# Patient Record
Sex: FEMALE | Race: BLACK OR AFRICAN AMERICAN | NOT HISPANIC OR LATINO | URBAN - METROPOLITAN AREA
[De-identification: names, ages, dates, MRNs, and addresses within clinical notes are randomized per-mention and may not be internally consistent; named-entity substitution may affect disease eponyms.]

---

## 2024-04-09 ENCOUNTER — OFFICE VISIT (OUTPATIENT)
Dept: PAIN MEDICINE | Facility: CLINIC | Age: 39
End: 2024-04-09
Payer: COMMERCIAL

## 2024-04-09 VITALS — BODY MASS INDEX: 40.65 KG/M2 | WEIGHT: 259 LBS | HEIGHT: 67 IN

## 2024-04-09 DIAGNOSIS — M25.551 RIGHT HIP PAIN: Primary | ICD-10-CM

## 2024-04-09 DIAGNOSIS — M53.3 SACROILIAC JOINT PAIN: ICD-10-CM

## 2024-04-09 DIAGNOSIS — M54.50 CHRONIC RIGHT-SIDED LOW BACK PAIN WITHOUT SCIATICA: ICD-10-CM

## 2024-04-09 DIAGNOSIS — G89.29 CHRONIC RIGHT-SIDED LOW BACK PAIN WITHOUT SCIATICA: ICD-10-CM

## 2024-04-09 PROCEDURE — 1160F RVW MEDS BY RX/DR IN RCRD: CPT | Mod: CPTII,S$GLB,, | Performed by: PHYSICAL MEDICINE & REHABILITATION

## 2024-04-09 PROCEDURE — 3008F BODY MASS INDEX DOCD: CPT | Mod: CPTII,S$GLB,, | Performed by: PHYSICAL MEDICINE & REHABILITATION

## 2024-04-09 PROCEDURE — 99204 OFFICE O/P NEW MOD 45 MIN: CPT | Mod: S$GLB,,, | Performed by: PHYSICAL MEDICINE & REHABILITATION

## 2024-04-09 PROCEDURE — 1159F MED LIST DOCD IN RCRD: CPT | Mod: CPTII,S$GLB,, | Performed by: PHYSICAL MEDICINE & REHABILITATION

## 2024-04-09 RX ORDER — DICLOFENAC SODIUM 50 MG/1
50 TABLET, DELAYED RELEASE ORAL 3 TIMES DAILY PRN
COMMUNITY
Start: 2024-03-27 | End: 2024-05-24

## 2024-04-09 RX ORDER — TIZANIDINE 2 MG/1
2 TABLET ORAL EVERY 8 HOURS PRN
COMMUNITY
Start: 2024-03-27

## 2024-04-09 RX ORDER — HYDROXYZINE HYDROCHLORIDE 25 MG/1
25 TABLET, FILM COATED ORAL 3 TIMES DAILY PRN
COMMUNITY
Start: 2024-03-27

## 2024-04-09 RX ORDER — GABAPENTIN 300 MG/1
300 CAPSULE ORAL 3 TIMES DAILY
COMMUNITY
Start: 2024-03-27

## 2024-04-09 NOTE — PROGRESS NOTES
[facility]      Referring Provider: Lacie Rose Do  [address] [place] [address]    Chief Complaint:   Chief Complaint   Patient presents with    Low-back Pain     Right side        History of Present Illness: Mae Liu is a 38 y.o. female referred by Dr. Lacie Rose for lower back pain.      Onset: 3/2021 s/p hysterectomy  Location: Right lower back/buttocks  Radiation: right groin and right hamstring/posterior thigh  Timing: constant  Quality: Aching, Throbbing, Tingling, Pins/Needles, and Deep  Exacerbating Factors: exercise, running, bending forward, twisting, turning, general activity, and driving  Alleviating Factors: ice, heat, rest, massage, stretching, general activity  Associated Symptoms: She denies night fever/night sweats, urinary incontinence/change in function, bowel incontinence/change in function, unexplained weight loss, significant motor weakness, and loss of sensations    Functional Limitations: Pain is limiting her ability to drive, chores.    She has tried physical therapy for 8 weeks at [facility]. She is taking gabapentin and flexeril which helps some. Diclofenac helps some. She is also taking tizanidine which helps better than flexeril.     Severity: Currently: 1/10   Typical Range: 1-10/10     Exacerbation: 10/10     P = 3   E = 4  G = 4  Baseline PEG Score = 3.67  Current PEG Score: 3.67    Opioid Risk Score         Value Time User    Opioid Risk Score  1 4/9/2024 10:31 AM Cortes Boyer MA             Previous Interventions:      Previous Therapies:  PT/OT: yes x8 weeks, provided no relief  Relevant Surgery: no   Previous Medications:   - NSAIDS: diclofenac   - Muscle Relaxants: tizanidine. Flexeril.    - TCAs:   - SNRIs:   - Topicals:   - Anticonvulsants: gabapentin   - Opioids:     Current Pain Medications:  Diclofenac 50mg  Gabapentin 300mg  Tizanidine 2mg     Blood Thinners: None    Full Medication List:    Current Outpatient Medications:      "diclofenac (VOLTAREN) 50 MG EC tablet, Take 50 mg by mouth 3 (three) times daily as needed., Disp: , Rfl:     gabapentin (NEURONTIN) 300 MG capsule, Take 300 mg by mouth 3 (three) times daily., Disp: , Rfl:     hydrOXYzine HCL (ATARAX) 25 MG tablet, Take 25 mg by mouth 3 (three) times daily as needed., Disp: , Rfl:     tiZANidine (ZANAFLEX) 2 MG tablet, Take 2 mg by mouth every 8 (eight) hours as needed., Disp: , Rfl:      Review of Systems: See HPI    Allergies:  Patient has no known allergies.     Medical History:   has no past medical history on file.    Surgical History:   has a past surgical history that includes Hysterectomy and Tubal ligation.    Family History:  family history is not on file.    Social History:   reports that she has never smoked. She has never used smokeless tobacco. She reports that she does not currently use alcohol. She reports that she does not use drugs.    Physical Exam:  Ht 5' 7" (1.702 m)   Wt 117.5 kg (259 lb)   BMI 40.57 kg/m²   GEN: No acute distress. Calm, comfortable  HENT: Normocephalic, atraumatic, moist mucous membranes  EYE: Anicteric sclera, non-injected.   CV: Non-diaphoretic. Regular Rate. Radial Pulses 2+.  RESP: Breathing comfortably. Chest expansion symmetric.  EXT: No clubbing, cyanosis.   SKIN: Warm, & dry to palpation. No visible rashes or lesions of exposed skin.   PSYCH: Pleasant mood and appropriate affect. Recent and remote memory intact.   GAIT: Independent, normal ambulation  Lumbar Spine Exam:       Inspection: No erythema, bruising.       Palpation: (+) TTP of lumbar paraspinals and SIJ on right (more tender over right SIJ)       ROM: Slightly Limited in flexion, extension, lateral bending.       (+) Facet loading on right      (-) Straight Leg Raise bilaterally      (+) HOANG bilaterally with pain on right      (+) Gaenslan's Test on right      (+) Thigh thrust/Posterior Pelvic Pain Provocation Test on right  Hip Exam:      Inspection: No gross deformity " "or apparent leg length discrepancy      Palpation: (+) TTP to right hip flexor/groin, otherwise No TTP to bilateral greater trochanteric bursas.       ROM:  No limitation or pain in internal rotation, external rotation b/l   Pain w/ resisted hip flexion on right  Neurologic Exam:     Alert. Speech is fluent and appropriate.     Strength:  5/5 throughout bilateral lower extremities     Sensation:  Grossly intact to light touch in bilateral lower extremities     Reflexes: 2+ in b/l patella, achilles     Tone: No abnormality appreciated in bilateral lower extremities     No Clonus           Imaging:  - MRI Lumbar 1/16/24:       Labs:  BMP  No results found for: "NA", "K", "CL", "CO2", "BUN", "CREATININE", "CALCIUM", "ANIONGAP", "EGFRNORACEVR"  No results found for: "ALT", "AST", "GGT", "ALKPHOS", "BILITOT"  No results found for: "PLT"    Assessment:  Mae Liu is a 38 y.o. female with the following diagnoses based on history, exam, and imaging:    Problem List Items Addressed This Visit    None  Visit Diagnoses       Right hip pain    -  Primary    Relevant Orders    X-Ray Hips Bilateral 2 View Incl AP Pelvis (Completed)    Ambulatory referral/consult to Physical/Occupational Therapy    Chronic right-sided low back pain without sciatica        Relevant Orders    X-Ray Lumbar Spine AP And Lateral (Completed)    Ambulatory referral/consult to Physical/Occupational Therapy    Sacroiliac joint pain        Relevant Orders    Ambulatory referral/consult to Physical/Occupational Therapy            This is a pleasant 38 y.o. lady presenting with:     - Chronic right low back pain: Pain is multifactorial, but suspect primarily due to SIJ dysfunction. There is some edema in the right L4-5 facet joint which may also be contributing and possible synovial cyst extending posteriorly from right L4-5 facet joint.   - Right hip pain: X-rays normal. Suspect hip flexor strain.   - Comorbidities: BMI > 40.     Treatment Plan:   - " PT/OT/HEP: Refer to PT to work on SIJ and hip flexor. Discussed benefits of exercise for pain.   - Procedures: Plan for right SIJ CSI if no relief with conservative measures.   - Medications: No changes recommended at this time.   - Consider adding duloxetine if patient does not improve with above  - Imaging: Reviewed. X-ray lumbar spine and hips.   - Labs: Reviewed.  Medications are appropriately dosed for current hepatorenal function.    Follow Up: RTC in 4-6 weeks or sooner PRN    Brenda Morris MD  Interventional Pain Medicine

## 2024-04-10 DIAGNOSIS — M54.50 LOW BACK PAIN, UNSPECIFIED BACK PAIN LATERALITY, UNSPECIFIED CHRONICITY, UNSPECIFIED WHETHER SCIATICA PRESENT: Primary | ICD-10-CM

## 2024-04-25 ENCOUNTER — TELEPHONE (OUTPATIENT)
Dept: PAIN MEDICINE | Facility: CLINIC | Age: 39
End: 2024-04-25
Payer: COMMERCIAL

## 2024-04-25 NOTE — TELEPHONE ENCOUNTER
As of today, Auth of MRI approved by secondary insurance, but denied by primary- [facility].     called [facility] to request notes of these findings to be able to submit to pt's insurance to appeal. Had to leave [facility] for a return call.

## 2024-04-25 NOTE — TELEPHONE ENCOUNTER
----- Message from Cortes Boyer MA sent at 4/22/2024  4:52 PM CDT -----    ----- Message -----  From: Brenda Morris MD  Sent: 4/18/2024   2:57 PM CDT  To: Alexandra Gutierrez Staff    Please call patient and let her know that I discussed with her PT, Norman, and I have ordered an MRI with arthrogram of the right hip to better evaluate for possible labral pathology.     Please fax order.

## 2024-04-25 NOTE — TELEPHONE ENCOUNTER
Spoke with pt and informed of Dr Morris's new orders. Pt verbalized understanding and will await a call from scheduling once MRI is approved.

## 2024-05-02 ENCOUNTER — TELEPHONE (OUTPATIENT)
Dept: PAIN MEDICINE | Facility: CLINIC | Age: 39
End: 2024-05-02
Payer: COMMERCIAL

## 2024-05-02 NOTE — TELEPHONE ENCOUNTER
Peer to peer completed for MRI Arthrogram of right hip. Imaging approved. Authorization [id number].

## 2024-05-02 NOTE — TELEPHONE ENCOUNTER
----- Message from Beatriz Beltran sent at 5/1/2024  8:34 AM CDT -----  Patient is requesting a call back at [phone]

## 2024-05-02 NOTE — TELEPHONE ENCOUNTER
I spoke with Mrs Liu to let her know we are scheduling a peer to peer and submitting physical therapy notes to her insurance provider to get approval for MRI.

## 2024-05-15 ENCOUNTER — TELEPHONE (OUTPATIENT)
Dept: PAIN MEDICINE | Facility: CLINIC | Age: 39
End: 2024-05-15
Payer: COMMERCIAL

## 2024-05-15 NOTE — TELEPHONE ENCOUNTER
----- Message from Jahaira Tam sent at 5/15/2024  3:55 PM CDT -----  Contact: PT  Patient Call Back    Patient Name: Mae Liu   Nature of Call: Pt is calling to request for a request to be sent to Medicaid to be approved for a extension of an MRI order. The new scheduling date is 6/5  Call Back Number:[phone]    Additional Information: n/a

## 2024-05-24 ENCOUNTER — OFFICE VISIT (OUTPATIENT)
Dept: PAIN MEDICINE | Facility: CLINIC | Age: 39
End: 2024-05-24
Payer: COMMERCIAL

## 2024-05-24 VITALS
OXYGEN SATURATION: 98 % | HEART RATE: 76 BPM | SYSTOLIC BLOOD PRESSURE: 126 MMHG | BODY MASS INDEX: 40.65 KG/M2 | HEIGHT: 67 IN | WEIGHT: 259 LBS | DIASTOLIC BLOOD PRESSURE: 80 MMHG

## 2024-05-24 DIAGNOSIS — M25.551 RIGHT HIP PAIN: ICD-10-CM

## 2024-05-24 DIAGNOSIS — M54.50 CHRONIC BILATERAL LOW BACK PAIN WITHOUT SCIATICA: ICD-10-CM

## 2024-05-24 DIAGNOSIS — M53.3 SACROILIAC JOINT DYSFUNCTION: Primary | ICD-10-CM

## 2024-05-24 DIAGNOSIS — G89.29 CHRONIC BILATERAL LOW BACK PAIN WITHOUT SCIATICA: ICD-10-CM

## 2024-05-24 DIAGNOSIS — M53.3 SACROILIAC JOINT PAIN: ICD-10-CM

## 2024-05-24 DIAGNOSIS — M46.1 BILATERAL SACROILIITIS: Primary | ICD-10-CM

## 2024-05-24 PROCEDURE — 3008F BODY MASS INDEX DOCD: CPT | Mod: CPTII,S$GLB,, | Performed by: PHYSICIAN ASSISTANT

## 2024-05-24 PROCEDURE — 1159F MED LIST DOCD IN RCRD: CPT | Mod: CPTII,S$GLB,, | Performed by: PHYSICIAN ASSISTANT

## 2024-05-24 PROCEDURE — 3079F DIAST BP 80-89 MM HG: CPT | Mod: CPTII,S$GLB,, | Performed by: PHYSICIAN ASSISTANT

## 2024-05-24 PROCEDURE — 99214 OFFICE O/P EST MOD 30 MIN: CPT | Mod: S$GLB,,, | Performed by: PHYSICIAN ASSISTANT

## 2024-05-24 PROCEDURE — 3074F SYST BP LT 130 MM HG: CPT | Mod: CPTII,S$GLB,, | Performed by: PHYSICIAN ASSISTANT

## 2024-05-24 RX ORDER — DICLOFENAC SODIUM 75 MG/1
TABLET, DELAYED RELEASE ORAL
COMMUNITY
Start: 2024-04-16 | End: 2024-06-18

## 2024-05-24 NOTE — PROGRESS NOTES
[facility]    Chief Complaint:   Chief Complaint   Patient presents with    Low-back Pain       History of Present Illness: Mae Liu is a 38 y.o. female referred by Dr. Lacie Rose for lower back pain.      Onset: 3/2021 s/p hysterectomy  Location: Right lower back/buttocks  Radiation: right groin and right hamstring/posterior thigh  Timing: constant  Quality: Aching, Throbbing, Tingling, Pins/Needles, and Deep  Exacerbating Factors: exercise, running, bending forward, twisting, turning, general activity, and driving  Alleviating Factors: ice, heat, rest, massage, stretching, general activity  Associated Symptoms: She denies night fever/night sweats, urinary incontinence/change in function, bowel incontinence/change in function, unexplained weight loss, significant motor weakness, and loss of sensations    Functional Limitations: Pain is limiting her ability to drive, chores.    She has tried physical therapy for 8 weeks at [facility]. She is taking gabapentin and flexeril which helps some. Diclofenac helps some. She is also taking tizanidine which helps better than flexeril.     Severity: Currently: 1/10   Typical Range: 1-10/10     Exacerbation: 10/10     P = 3   E = 4  G = 4  Baseline PEG Score = 3.67      Interval History (05/24/2024):  Mae Liu returns today for follow up.  At the last clinic visit, referred to physical therapy low back pain    Physical therapy is providing 50% relief, she is continuing to go twice a week.     She is scheduled for MRI of right hip early June.     Currently, the low back pain is stable. Pain now localized to bialteral lumbosacral region without radiation.  Denies any changes in bowel or bladder function. Denies any new weakness or new numbness since the most recent visit. Denies any fevers or recent infections/antibiotics. Denies any unexplained weight loss.     Current Pain Scales:  Current: 7/10              Typical Range: 0-10/10   Current PEG Score:  "9    Opioid Risk Score         Value Time User    Opioid Risk Score  1 4/9/2024 10:31 AM Cortes Boyer MA             Previous Interventions:      Previous Therapies:  PT/OT: yes x8 weeks, provided no relief  Relevant Surgery: no   Previous Medications:   - NSAIDS: diclofenac   - Muscle Relaxants: tizanidine. Flexeril.    - TCAs:   - SNRIs:   - Topicals:   - Anticonvulsants: gabapentin   - Opioids:     Current Pain Medications:  Diclofenac 50mg  Gabapentin 300mg  Tizanidine 2mg     Blood Thinners: None    Full Medication List:    Current Outpatient Medications:     diclofenac (VOLTAREN) 75 MG EC tablet, , Disp: , Rfl:     gabapentin (NEURONTIN) 300 MG capsule, Take 300 mg by mouth 3 (three) times daily., Disp: , Rfl:     hydrOXYzine HCL (ATARAX) 25 MG tablet, Take 25 mg by mouth 3 (three) times daily as needed., Disp: , Rfl:     tiZANidine (ZANAFLEX) 2 MG tablet, Take 2 mg by mouth every 8 (eight) hours as needed., Disp: , Rfl:      Review of Systems: See HPI    Allergies:  Patient has no known allergies.     Medical History:   has no past medical history on file.    Surgical History:   has a past surgical history that includes Hysterectomy and Tubal ligation.    Family History:  family history is not on file.    Social History:   reports that she has never smoked. She has never used smokeless tobacco. She reports that she does not currently use alcohol. She reports that she does not use drugs.    Physical Exam:  /80   Pulse 76   Ht 5' 7" (1.702 m)   Wt 117.5 kg (259 lb)   SpO2 98%   BMI 40.57 kg/m²   GEN: No acute distress. Calm, comfortable  HENT: Normocephalic, atraumatic, moist mucous membranes  EYE: Anicteric sclera, non-injected.   CV: Non-diaphoretic. Regular Rate. Radial Pulses 2+.  RESP: Breathing comfortably. Chest expansion symmetric.  EXT: No clubbing, cyanosis.   SKIN: Warm, & dry to palpation. No visible rashes or lesions of exposed skin.   PSYCH: Pleasant mood and appropriate affect. " "Recent and remote memory intact.   GAIT: Independent, normal ambulation  Lumbar Spine Exam:       Inspection: No erythema, bruising.       Palpation: (+) TTP of lumbar paraspinals and SIJ bilaterally (right > left)      ROM: Slightly Limited in flexion, extension, lateral bending.       (+) Facet loading on right      (-) Straight Leg Raise bilaterally      (+) HOANG bilaterally with pain on right      (+) Gaenslan's Test on right      (+) Thigh thrust/Posterior Pelvic Pain Provocation Test on right  Hip Exam:      Inspection: No gross deformity or apparent leg length discrepancy      Palpation: (+) TTP to right hip flexor/groin, otherwise No TTP to bilateral greater trochanteric bursas.       ROM:  No limitation or pain in internal rotation, external rotation b/l   Pain w/ resisted hip flexion on right  Neurologic Exam:     Alert. Speech is fluent and appropriate.     Strength:  5/5 throughout bilateral lower extremities     Sensation:  Grossly intact to light touch in bilateral lower extremities     Reflexes: 2+ in b/l patella, achilles     Tone: No abnormality appreciated in bilateral lower extremities     No Clonus           Imaging:  - MRI Lumbar 1/16/24:       Labs:  BMP  No results found for: "NA", "K", "CL", "CO2", "BUN", "CREATININE", "CALCIUM", "ANIONGAP", "EGFRNORACEVR"  No results found for: "ALT", "AST", "GGT", "ALKPHOS", "BILITOT"  No results found for: "PLT"    Assessment:  Mae Liu is a 38 y.o. female with the following diagnoses based on history, exam, and imaging:    Problem List Items Addressed This Visit    None  Visit Diagnoses       Sacroiliac joint dysfunction    -  Primary    Sacroiliac joint pain        Chronic bilateral low back pain without sciatica        Right hip pain                This is a pleasant 38 y.o. lady presenting with:     - Chronic right low back pain: Pain is multifactorial, but suspect primarily due to SIJ dysfunction. There is some edema in the right L4-5 facet " joint which may also be contributing and possible synovial cyst extending posteriorly from right L4-5 facet joint.   - Right hip pain: X-rays normal. Suspect hip flexor strain.   - Comorbidities: BMI > 40.     Treatment Plan:   - PT/OT/HEP: Continue PT to work on SIJ and hip flexor. Discussed benefits of exercise for pain.   - Procedures: Schedule bilateral SIJ CSI w/ local/MAC per patient request.   - Medications:   - Recommend using Tylenol 1000 mg every 8 hours as needed for pain.  Do not take this with any other medications containing acetaminophen.  Do not exceed 3000 mg of acetaminophen in 24 hours.   - Consider adding duloxetine if patient does not improve with above  - Imaging: Reviewed. MRI right hip pending.   - Labs: None.  -Request Labs from PCP    Follow Up: RTC in 2 weeks after procedure.    I spent greater than 30 minutes in total in todays visit including face-to-face time with the patient, and time reviewing records/imaging/labs, and documenting.     Fátima Shore PA-C   Interventional Pain Medicine

## 2024-06-03 ENCOUNTER — OUTSIDE PLACE OF SERVICE (OUTPATIENT)
Dept: PAIN MEDICINE | Facility: CLINIC | Age: 39
End: 2024-06-03

## 2024-06-03 PROCEDURE — 27096 INJECT SACROILIAC JOINT: CPT | Mod: 50,,, | Performed by: PHYSICAL MEDICINE & REHABILITATION

## 2024-06-05 ENCOUNTER — OUTSIDE PLACE OF SERVICE (OUTPATIENT)
Dept: INTERVENTIONAL RADIOLOGY/VASCULAR | Facility: CLINIC | Age: 39
End: 2024-06-05
Payer: COMMERCIAL

## 2024-06-18 ENCOUNTER — OFFICE VISIT (OUTPATIENT)
Dept: PAIN MEDICINE | Facility: CLINIC | Age: 39
End: 2024-06-18
Payer: COMMERCIAL

## 2024-06-18 VITALS
HEIGHT: 67 IN | SYSTOLIC BLOOD PRESSURE: 133 MMHG | HEART RATE: 71 BPM | BODY MASS INDEX: 40.65 KG/M2 | DIASTOLIC BLOOD PRESSURE: 94 MMHG | RESPIRATION RATE: 16 BRPM | OXYGEN SATURATION: 98 % | WEIGHT: 259 LBS

## 2024-06-18 DIAGNOSIS — M53.3 SACROILIAC JOINT DYSFUNCTION: ICD-10-CM

## 2024-06-18 DIAGNOSIS — S73.191D TEAR OF RIGHT ACETABULAR LABRUM, SUBSEQUENT ENCOUNTER: ICD-10-CM

## 2024-06-18 DIAGNOSIS — M25.551 RIGHT HIP PAIN: Primary | ICD-10-CM

## 2024-06-18 PROCEDURE — 3075F SYST BP GE 130 - 139MM HG: CPT | Mod: CPTII,S$GLB,, | Performed by: PHYSICAL MEDICINE & REHABILITATION

## 2024-06-18 PROCEDURE — 3008F BODY MASS INDEX DOCD: CPT | Mod: CPTII,S$GLB,, | Performed by: PHYSICAL MEDICINE & REHABILITATION

## 2024-06-18 PROCEDURE — 1159F MED LIST DOCD IN RCRD: CPT | Mod: CPTII,S$GLB,, | Performed by: PHYSICAL MEDICINE & REHABILITATION

## 2024-06-18 PROCEDURE — 99214 OFFICE O/P EST MOD 30 MIN: CPT | Mod: S$GLB,,, | Performed by: PHYSICAL MEDICINE & REHABILITATION

## 2024-06-18 PROCEDURE — 3080F DIAST BP >= 90 MM HG: CPT | Mod: CPTII,S$GLB,, | Performed by: PHYSICAL MEDICINE & REHABILITATION

## 2024-06-18 PROCEDURE — 1160F RVW MEDS BY RX/DR IN RCRD: CPT | Mod: CPTII,S$GLB,, | Performed by: PHYSICAL MEDICINE & REHABILITATION

## 2024-06-18 NOTE — PROGRESS NOTES
[facility]    Chief Complaint:   Chief Complaint   Patient presents with    Low-back Pain     C/o low back pain, s/p bilateral SIJ CSI w/ local/MAC, currently doing well w/PT, states it helps about 50%    Hip Pain     C/o Rt hip pain, states PT makes this worse       History of Present Illness: Mae Liu is a 38 y.o. female referred by Dr. Lacie Rose for lower back pain.      Onset: 3/2021 s/p hysterectomy  Location: Right lower back/buttocks  Radiation: right groin and right hamstring/posterior thigh  Timing: constant  Quality: Aching, Throbbing, Tingling, Pins/Needles, and Deep  Exacerbating Factors: exercise, running, bending forward, twisting, turning, general activity, and driving  Alleviating Factors: ice, heat, rest, massage, stretching, general activity  Associated Symptoms: She denies night fever/night sweats, urinary incontinence/change in function, bowel incontinence/change in function, unexplained weight loss, significant motor weakness, and loss of sensations    Functional Limitations: Pain is limiting her ability to drive, chores.    She has tried physical therapy for 8 weeks at [facility]. She is taking gabapentin and flexeril which helps some. Diclofenac helps some. She is also taking tizanidine which helps better than flexeril.     Severity: Currently: 1/10   Typical Range: 1-10/10     Exacerbation: 10/10     P = 3   E = 4  G = 4  Baseline PEG Score = 3.67      Interval History (05/24/2024):  Mae Liu returns today for follow up.  At the last clinic visit, referred to physical therapy low back pain    Physical therapy is providing 50% relief, she is continuing to go twice a week.     She is scheduled for MRI of right hip early June.     Currently, the low back pain is stable. Pain now localized to bialteral lumbosacral region without radiation.  Denies any changes in bowel or bladder function. Denies any new weakness or new numbness since the most recent visit. Denies any  fevers or recent infections/antibiotics. Denies any unexplained weight loss.     Current Pain Scales:  Current: 7/10              Typical Range: 0-10/10     Interval History (06/18/2024):  Mae Liu returns today for follow up.  At the last clinic visit, Schedule bilateral SIJ CSI w/ local/MAC per patient request.     bilateral SIJ CSI w/ local/MAC provided 80% relief of the back pain, but she is still having the right hip pain.     Currently, the low back pain is improved.  The back is no longer limiting her, but she is still having significant right hip pain.     Current Pain Scales:  Current: 4/10              Typical Range: 4-7/10     Current PEG Score: 5    Opioid Risk Score         Value Time User    Opioid Risk Score  1 4/9/2024 10:31 AM Cortes Boyer MA             Previous Interventions:      Previous Therapies:  PT/OT: yes x8 weeks, provided no relief  Relevant Surgery: no   Previous Medications:   - NSAIDS: diclofenac   - Muscle Relaxants: tizanidine. Flexeril.    - TCAs:   - SNRIs:   - Topicals:   - Anticonvulsants: gabapentin   - Opioids:     Current Pain Medications:  Diclofenac 50mg  Gabapentin 300mg  Tizanidine 2mg     Blood Thinners: None    Full Medication List:    Current Outpatient Medications:     gabapentin (NEURONTIN) 300 MG capsule, Take 300 mg by mouth 3 (three) times daily., Disp: , Rfl:     hydrOXYzine HCL (ATARAX) 25 MG tablet, Take 25 mg by mouth 3 (three) times daily as needed., Disp: , Rfl:     tiZANidine (ZANAFLEX) 2 MG tablet, Take 2 mg by mouth every 8 (eight) hours as needed., Disp: , Rfl:     diclofenac (VOLTAREN) 75 MG EC tablet, , Disp: , Rfl:      Review of Systems: See HPI    Allergies:  Patient has no known allergies.     Medical History:   has no past medical history on file.    Surgical History:   has a past surgical history that includes Hysterectomy and Tubal ligation.    Family History:  family history is not on file.    Social History:   reports that she has  "never smoked. She has never used smokeless tobacco. She reports that she does not currently use alcohol. She reports that she does not use drugs.    Physical Exam:  BP (!) 133/94 (BP Location: Left arm, Patient Position: Sitting, BP Method: Large (Automatic))   Pulse 71   Resp 16   Ht 5' 7" (1.702 m)   Wt 117.5 kg (259 lb)   SpO2 98%   BMI 40.57 kg/m²   GEN: No acute distress. Calm, comfortable  HENT: Normocephalic, atraumatic, moist mucous membranes  EYE: Anicteric sclera, non-injected.   CV: Non-diaphoretic. Regular Rate. Radial Pulses 2+.  RESP: Breathing comfortably. Chest expansion symmetric.  EXT: No clubbing, cyanosis.   SKIN: Warm, & dry to palpation. No visible rashes or lesions of exposed skin.   PSYCH: Pleasant mood and appropriate affect. Recent and remote memory intact.   GAIT: Independent, normal ambulation  Lumbar Spine Exam:       Inspection: No erythema, bruising.       Palpation: (+) TTP of lumbar paraspinals and SIJ bilaterally (right > left)      ROM: Slightly Limited in flexion, extension, lateral bending.       (+) Facet loading on right      (-) Straight Leg Raise bilaterally      (+) HOANG bilaterally with pain on right      (+) Gaenslan's Test on right      (+) Thigh thrust/Posterior Pelvic Pain Provocation Test on right  Hip Exam:      Inspection: No gross deformity or apparent leg length discrepancy      Palpation: (+) TTP to right hip flexor/groin, otherwise No TTP to bilateral greater trochanteric bursas.       ROM:  No limitation or pain in internal rotation, external rotation b/l   Pain w/ resisted hip flexion on right  Neurologic Exam:     Alert. Speech is fluent and appropriate.     Strength:  5/5 throughout bilateral lower extremities     Sensation:  Grossly intact to light touch in bilateral lower extremities     Reflexes: 2+ in b/l patella, achilles     Tone: No abnormality appreciated in bilateral lower extremities     No Clonus           Imaging:  - MR Arthrogram right hip " "6/5/24:  Demonstrated on series 103, image 18, is intra-articular fluid extending through the anterior superior acetabular labrum, compatible with a labral tear. The superior lateral acetabular labrum is intact. No evidence of   chondrolabral separation.    No evidence of significant osteoarthritis. No significant cartilage loss is seen.    There is no muscle atrophy or edema. The gluteus medius and minimus tendinous insertions are intact. No evidence of iliopsoas or trochanteric bursitis. The right hamstring tendon origin is intact.        - MRI Lumbar 1/16/24:       Labs:  BMP  No results found for: "NA", "K", "CL", "CO2", "BUN", "CREATININE", "CALCIUM", "ANIONGAP", "EGFRNORACEVR"  No results found for: "ALT", "AST", "GGT", "ALKPHOS", "BILITOT"  No results found for: "PLT"    Assessment:  Mae Liu is a 38 y.o. female with the following diagnoses based on history, exam, and imaging:    Problem List Items Addressed This Visit    None  Visit Diagnoses       Right hip pain    -  Primary    Relevant Orders    Ambulatory referral/consult to Orthopedics    Tear of right acetabular labrum, subsequent encounter        Relevant Orders    Ambulatory referral/consult to Orthopedics    Sacroiliac joint dysfunction                  This is a pleasant 38 y.o. lady presenting with:     - Chronic right low back pain: Pain is multifactorial, but suspect primarily due to SIJ dysfunction. There is some edema in the right L4-5 facet joint which may also be contributing and possible synovial cyst extending posteriorly from right L4-5 facet joint.    - LBP improved after CSI of SIJ  - Right hip pain: X-rays normal. Suspect hip flexor strain.    - Right hip labral tear with pain persisting despite conservative measures.   - Comorbidities: BMI > 40.     Treatment Plan:   - PT/OT/HEP: Hold PT. Discussed benefits of exercise for pain.   - Procedures: Can repeat bilateral SIJ CSI PRN back pain   - Medications:   - Recommend using " Tylenol 1000 mg every 8 hours as needed for pain.  Do not take this with any other medications containing acetaminophen.  Do not exceed 3000 mg of acetaminophen in 24 hours.   - Consider adding duloxetine if patient does not improve with above  - Imaging: Reviewed.  - Labs: None.  -Request Labs from PCP  - Refer to Ortho for right hip labral tear      Follow Up: RTC PRN      Brenda Morris MD  Interventional Pain Medicine

## 2024-07-29 ENCOUNTER — OUTSIDE PLACE OF SERVICE (OUTPATIENT)
Dept: INTERVENTIONAL RADIOLOGY/VASCULAR | Facility: CLINIC | Age: 39
End: 2024-07-29
Payer: COMMERCIAL